# Patient Record
Sex: FEMALE | Race: WHITE | NOT HISPANIC OR LATINO | Employment: UNEMPLOYED | URBAN - METROPOLITAN AREA
[De-identification: names, ages, dates, MRNs, and addresses within clinical notes are randomized per-mention and may not be internally consistent; named-entity substitution may affect disease eponyms.]

---
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
## 2020-01-01 NOTE — HPI
This is an 18-year-old who had presented with a ruptured AVM that was resected and removed, but had to do a decompressive   craniectomy.  The patient had a followup angiogram, which showed no residual.  The patient was ready for cranioplasty, but had a significant subdural hygroma that needed to be evacuated and drained first.   Statement Selected

## 2020-02-10 ENCOUNTER — PATIENT MESSAGE (OUTPATIENT)
Dept: NEUROLOGY | Facility: CLINIC | Age: 20
End: 2020-02-10

## 2020-06-09 ENCOUNTER — TELEPHONE (OUTPATIENT)
Dept: NEUROLOGY | Facility: CLINIC | Age: 20
End: 2020-06-09

## 2020-06-09 NOTE — TELEPHONE ENCOUNTER
----- Message from Naima Dan RN sent at 6/9/2020  3:18 PM CDT -----  Contact: Elmira (mother) @ [phone]      ----- Message -----  From: Joy Armstrong  Sent: 6/9/2020   3:15 PM CDT  To: [facility] Staff    Asking to speak with someone in Dr. Miller's office regarding patient appt, asking if it would be ok with the doctor to have patient do a virtual appt. Please call.

## 2020-06-12 RX ORDER — ZONISAMIDE 100 MG/1
300 CAPSULE ORAL DAILY
Qty: 90 CAPSULE | Refills: 1 | OUTPATIENT
Start: 2020-06-12 | End: 2020-07-12

## 2020-06-12 NOTE — TELEPHONE ENCOUNTER
----- Message from Nikky Zapata sent at 2020  9:04 AM CDT -----  Contact: pts mom   Pts mom is calling to check the status of a refill on a refill request for the medication called zonisamide (ZONEGRAN) 100 MG Cap (). Pt is out of her medication pt has two days left pts refill isn't valid any more because it . [facility]  was suppose to send over a refill request. Pt needs her medication asap  Can you please call pts mom at [phone].    CARISA

## 2020-07-20 ENCOUNTER — OFFICE VISIT (OUTPATIENT)
Dept: NEUROLOGY | Facility: CLINIC | Age: 20
End: 2020-07-20
Payer: OTHER GOVERNMENT

## 2020-07-20 DIAGNOSIS — R56.9 FOCAL SEIZURE: Primary | ICD-10-CM

## 2020-07-20 PROCEDURE — 99213 OFFICE O/P EST LOW 20 MIN: CPT | Mod: 95,,, | Performed by: PSYCHIATRY & NEUROLOGY

## 2020-07-20 PROCEDURE — 99213 PR OFFICE/OUTPT VISIT, EST, LEVL III, 20-29 MIN: ICD-10-PCS | Mod: 95,,, | Performed by: PSYCHIATRY & NEUROLOGY

## 2020-07-20 RX ORDER — FOLIC ACID 1 MG/1
1 TABLET ORAL DAILY
Qty: 90 TABLET | Refills: 4 | Status: SHIPPED | OUTPATIENT
Start: 2020-07-20 | End: 2021-07-20

## 2020-07-20 RX ORDER — ZONISAMIDE 100 MG/1
300 CAPSULE ORAL DAILY
Qty: 270 CAPSULE | Refills: 4 | Status: SHIPPED | OUTPATIENT
Start: 2020-07-20 | End: 2021-08-19 | Stop reason: SDUPTHER

## 2020-07-20 NOTE — PROGRESS NOTES
Follow up:   No COVID symptoms   Compliance - good   No side effects     Prior note:   Driving now   Working as    NO sz   compliance - good   MRI today      Prior note:   No further sz since last visit   NO side effects to medications   Not driving      HPI   Accompanied by mother and boy-friend      Onset of ICH: HA->vomitting-sz-> LOC   She reports - Sensation different on the site of staples      Seizure Seminology  Seizure Type 1   Age of onset : Mid July 2018   Classification: Focal onset seizures with sec GTC  Aura - no   Ictus      Nonconv -       Conv - eyes open wide and roll back, mouth opens wide, trembling/tonic of body, clonic jerking of arms and legs      Duration -   Post-ictal      Symptoms- confusion, groggy x 20 min       Duration-  Seizure Frequency -  total #2 in same day   History of status epilepticus - no   Last seizure -      Seizure triggers: unknown      AED Treatments  ER - ativan, IV keppra      zonisamide (Zonegran, ZNA) 200 mg daily (loss of appetite)      Prior treatments   levetiracetam (Keppra, LEV) 500 mg BID - sleepy      Not tried  acetazolamide (Diamox, AZM)  carbamazepine (Tegretol, CBZ)  ethosuximide (Zarontin, ESM)  ezogabine (Potiga, EZG)  gabapentin (Neurontin, GPN)  eslicarbazepine   lacosamide (Vimpat, LCS)   lamotrigine (Lamictal, LTG)   methsuximide (Celontin, MSM)  methyphenytoin (Mesantion, MHT)  oxcarbazepine (Trileptal OXC)  pregabalin (Lyrica, PGB)   primidone (Mysoline, PRM)  perampanel (Fycompa, FCP)   phenobarbital (Pb)   phenytoin (Dilantin, PHT)  rufinamide (Banzel, RUF)  tiagabine (Gabatril,  TGB)  topiramate (Topamax, TPM)  viagabatrin, (Sabril, VGB)  valproic acid (Depakote, VPA)  Benzodiazepines:  diazepam - rectal (Diastatl)  diazepam - oral (Valium, DZ)  clonazepam (Klonopin, CZP)  clorazepate (Tranxene, CLZ)  clobezam (Onfi, CLB)  Ativan  Other:  Brain Stimulation  Vagal Nerve Stimulator  DBS     Compliance method  Memory - yes     Potential  Epilepsy Risk Factors:   Pregnancy/Labor/Delivery - full term,  uncomplicated  Pregnancy, labor and delivery  Febrile seizures - none  Head injury  - none  CNS infection - none     Stroke - none  Family Hx of Sz - none  AVM and ICH      Driving - currently driving     EEG 8/21/18 -   IMPRESSION:  This is an abnormal EEG during wakefulness, drowsiness and sleep.    The breach rhythm was noted in the left temporoparietal region.     Review of Systems   Constitutional: Negative.    HENT: Negative.    Eyes: Negative.    Respiratory: Negative.    Cardiovascular: Negative.    Gastrointestinal: Negative.    Endocrine: Negative.    Genitourinary: Negative.    Musculoskeletal: Negative.    Skin: Negative.    Allergic/Immunologic: Negative.    Neurological: Negative.    Hematological: Negative.    Psychiatric/Behavioral: Negative.       Objective:   Physical Exam   Constitutional: She is oriented to person, place, and time. She appears well-developed and well-nourished.   Psychiatric: She has a normal mood and affect. Her behavior is normal. Judgment and thought content normal.      Assessment:      1. Cerebral AVM    2. Focal seizure       Plan:   1, zonisamide 300 mg QHS  2, Take Calcium, Vitamin D supplements, folic acid daily  3, Avoid stimulants and excessive caffeine      F/up 1 yr      Patient education:   During this visit we discussed several issues related to Epilepsy including risks with continuous seizures, SUDEP, risks associated with status epilepticus. I provided hand out on first aid measures during seizures. We also discussed side effects of anti-seizure medications, potential teratogenicity, and suicide risk.      We also discussed epilepsy and the issues related to pregnancy, birth control, and breast feeding. We addressed importance of folic acid supplement.      We discussed occupational risks and hazards, driving restrictions and limitations, and general safety in patients with epilepsy. I specifically  pointed out how the patient can put herself and others at serious risks (leading to death and/or injury) if she has a seizure while driving. Patient verbalized understanding. I requested that the patient meet with DMV to discuss protocol for driving privileges in patients with seizure disorders.     The patient location is: Home   The chief complaint leading to consultation is:seizures     Visit type: audiovisual    Face to Face time with patient: 15 min   18  minutes of total time spent on the encounter, which includes face to face time and non-face to face time preparing to see the patient (eg, review of tests), Obtaining and/or reviewing separately obtained history, Documenting clinical information in the electronic or other health record, Independently interpreting results (not separately reported) and communicating results to the patient/family/caregiver, or Care coordination (not separately reported).         Each patient to whom he or she provides medical services by telemedicine is:  (1) informed of the relationship between the physician and patient and the respective role of any other health care provider with respect to management of the patient; and (2) notified that he or she may decline to receive medical services by telemedicine and may withdraw from such care at any time.

## 2020-11-17 ENCOUNTER — TELEPHONE (OUTPATIENT)
Dept: NEUROLOGY | Facility: CLINIC | Age: 20
End: 2020-11-17

## 2020-11-17 NOTE — TELEPHONE ENCOUNTER
----- Message from Nikky Zapata sent at 11/17/2020 10:51 AM CST -----  Regarding: pts mom  Pts mom is calling to speak with the nurse to see if its okay for the pt to take the following medications called zyrtec sudafed ibuprofen and a medrol steroid pack. They want to make sure its okay for the pt to take these medication with her history and her medical condition can you please call pts mom at [phone].    CARISA

## 2021-02-16 NOTE — PROGRESS NOTES
[facility]  Neurocritical Care  Progress Note    Admit Date: 6/28/2018  Service Date: 06/30/2018  Length of Stay: 2    Subjective:     Chief Complaint: <principal problem not specified>    History of Present Illness: Lisa Rivera is a 18 y.o. 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     Hospital Course: 6/28: Post cranioplasty  6/29: Continue ICU care; nausea and pain overnight resolved with meds  6/30: Refusing care, only allowing pain meds, possible [place] [facility]      Patient with pain control issues overnight and nausea. Neurological status unchanged.     Review of Systems   Constitutional: Negative for fever.   Gastrointestinal: Positive for nausea.   Neurological: Negative for weakness.       Objective:     Vitals:  Temp: 98.2 °F (36.8 °C)  Pulse: 69  Rhythm: normal sinus rhythm  BP: (!) 112/57  MAP (mmHg): 78  Resp: 19  SpO2: 99 %  O2 Device (Oxygen Therapy): room air    Temp  Min: 97.9 °F (36.6 °C)  Max: 99 °F (37.2 °C)  Pulse  Min: 56  Max: 101  BP  Min: 110/58  Max: 138/61  MAP (mmHg)  Min: 78  Max: 100  Resp  Min: 12  Max: 28  SpO2  Min: 97 %  Max: 100 %    06/29 0701 - 06/30 0700  In: 483.3 [I.V.:483.3]  Out: 1400 [Urine:1230; Drains:170]   Unmeasured Output  Stool Occurrence: 0     Physical Exam   GA: Alert, comfortable, no acute distress.   HEENT: No scleral icterus or JVD.   Pulmonary: Clear to auscultation A/L. No wheezing, crackles, or rhonchi.  Cardiac: RRR S1 & S2 w/o rubs/murmurs/gallops.   Abdominal: Bowel sounds present x 4. No appreciable hepatosplenomegaly.  Skin: No jaundice, rashes, or visible lesions.  Neuro:  --GCS: E4 V5 M6  --Mental Status:  AAO x 3, following commands,   --CN II-XII grossly intact.   --Pupils 3mm, PERRL.   --Corneal reflex, gag, cough intact.  --LUE strength: 4/5  --LLE strength: 4/5  --RUE strength: 4/5  --RLE strength: 4/5    Medications:  Continuous  Scheduled    bacitracin  Daily   ceFAZolin (ANCEF) IVPB 1 g Q8H   magnesium sulfate 2 g Once   mupirocin 1 g BID   senna-docusate 8.6-50 mg 1 tablet Daily   PRN    acetaminophen 650 mg Q6H PRN   acetaminophen 650 mg Q6H PRN   acetaminophen 650 mg Q4H PRN   HYDROmorphone 0.5 mg Q1H PRN   ondansetron 8 mg Q6H PRN   ondansetron 4 mg Q8H PRN   oxyCODONE-acetaminophen 1 tablet Q4H PRN     Today I personally reviewed pertinent medications, imaging, cardiology, lab results, microbiology results, notably:    Diet  Diet Adult Regular (IDDSI Level 7)  Diet Adult Regular (IDDSI Level 7)            Assessment/Plan:     Oncology   Anemia    Normocytic anemia          Other   Status post craniectomy    S/p crani  NSGY following  SBP < 140  q 1 hour neuro checks  Drain present, ancef ppx  PT/OT  Consider transfer to [facility]              Activity Orders          None        Prior    Alvarado Reich NP  Neurocritical Care  [facility]     ADMIT

## 2021-03-29 ENCOUNTER — PATIENT MESSAGE (OUTPATIENT)
Dept: NEUROLOGY | Facility: CLINIC | Age: 21
End: 2021-03-29

## 2021-06-10 ENCOUNTER — PATIENT MESSAGE (OUTPATIENT)
Dept: NEUROLOGY | Facility: CLINIC | Age: 21
End: 2021-06-10

## 2021-07-08 ENCOUNTER — TELEPHONE (OUTPATIENT)
Dept: NEUROLOGY | Facility: CLINIC | Age: 21
End: 2021-07-08

## 2021-08-18 ENCOUNTER — PATIENT MESSAGE (OUTPATIENT)
Dept: NEUROLOGY | Facility: CLINIC | Age: 21
End: 2021-08-18

## 2021-08-18 DIAGNOSIS — R56.9 FOCAL SEIZURE: ICD-10-CM

## 2021-08-19 RX ORDER — ZONISAMIDE 100 MG/1
300 CAPSULE ORAL DAILY
Qty: 270 CAPSULE | Refills: 3 | Status: SHIPPED | OUTPATIENT
Start: 2021-08-19 | End: 2021-10-01 | Stop reason: SDUPTHER

## 2021-10-01 ENCOUNTER — OFFICE VISIT (OUTPATIENT)
Dept: NEUROLOGY | Facility: CLINIC | Age: 21
End: 2021-10-01
Payer: OTHER GOVERNMENT

## 2021-10-01 DIAGNOSIS — R56.9 FOCAL SEIZURE: Primary | ICD-10-CM

## 2021-10-01 PROCEDURE — 99213 OFFICE O/P EST LOW 20 MIN: CPT | Mod: 95,,, | Performed by: PSYCHIATRY & NEUROLOGY

## 2021-10-01 PROCEDURE — 99213 PR OFFICE/OUTPT VISIT, EST, LEVL III, 20-29 MIN: ICD-10-PCS | Mod: 95,,, | Performed by: PSYCHIATRY & NEUROLOGY

## 2021-10-01 RX ORDER — FOLIC ACID 1 MG/1
1 TABLET ORAL DAILY
Qty: 90 TABLET | Refills: 4 | Status: SHIPPED | OUTPATIENT
Start: 2021-10-01 | End: 2022-10-01

## 2021-10-01 RX ORDER — ZONISAMIDE 100 MG/1
300 CAPSULE ORAL DAILY
Qty: 270 CAPSULE | Refills: 3 | Status: SHIPPED | OUTPATIENT
Start: 2021-10-01 | End: 2021-12-30

## 2022-07-15 NOTE — PLAN OF CARE
Problem: Physical Therapy Goal  Goal: Physical Therapy Goal  Goals to be met by: 4/20/2018    Patient will increase functional independence with mobility by performing:    Supine to sit with Supervision.  Sit to supine with Supervision.   Sit to stand transfer with Contact Guard Assistance using No Assistive Device.  Bed to chair transfer via Stand Pivot with Contact Guard Assistance using No Assistive Device.  Gait  x 55 feet with Contact Guard Assistance using No Assistive Device.    Dynamic sitting at edge of bed x 10 minutes with Contact Guard Assistance.  Dynamic standing for 10 minutes with Contact Guard Assistance using No Assistive Device.  Able to tolerate exercise for 15-20 reps with independence.  Patient and family able to teachback stroke & positioning education independently.  Ascend/descend 5 stairs with right Handrails Minimal Assistance using No Assistive Device.    Outcome: Ongoing (interventions implemented as appropriate)    Pt would benefit from Rehab upon discharge. PT eval complted.  Appropriate to transfer with: therapy  Neda Dejesus PT, DPT  4/11/2018  Pager: [phone]           Anesthesia Type: 1% lidocaine with epinephrine

## 2025-05-28 NOTE — CONSULTS
"    Caller: Yulia Iniguez \"Luciana\"    Relationship: Self    Best call back number: [phone]     Requested Prescriptions:   Requested Prescriptions     Pending Prescriptions Disp Refills    lisinopril (PRINIVIL,ZESTRIL) 40 MG tablet 90 tablet 3     Sig: Take 1 tablet by mouth Daily.    hydroCHLOROthiazide 25 MG tablet 90 tablet 3     Sig: Take 1 tablet by mouth Daily.    atorvastatin (LIPITOR) 10 MG tablet 90 tablet 3     Sig: Take 1 tablet by mouth Daily.    alendronate (FOSAMAX) 70 MG tablet 12 tablet 3     Sig: TAKE 1 TABLET EVERY 7 DAYS WITH LARGE GLASS OF WATER 30 MIN BEFORE FIRST FOOD, DRINK, MEDS; AVOID LYING DOWN FOR 30 MIN        Pharmacy where request should be sent: [facility], [address] [phone] [place] [phone] FX     Last office visit with prescribing clinician: 2/21/2025   Last telemedicine visit with prescribing clinician: Visit date not found   Next office visit with prescribing clinician: 8/22/2025         Carmina Croft Rep   05/28/25 10:23 EDT       " [facility]  Vascular Neurology  Comprehensive Stroke Center  Consult Note    Inpatient consult to Vascular (Stroke) Neurology  Consult performed by: FLORINA LOPEZ  Consult ordered by: HARLAN VILLASEÑOR        Assessment/Plan:     Patient is a 18 y.o. year old female with:    * Nontraumatic cortical hemorrhage of left cerebral hemisphere    19 yo with no significant PMHx presenting as transfer from [facility] for ICH and emergent neurosurgical evaluation. Has presented with HA, N/V with progression to LOC. Intubated upon arrival to [place]. CTH revealed large acute intraparenchymal hematoma in Left temporal lobe. Transferred [place] [facility] [place] further NSGY intervention. Upon arrival, CTA H/N revealed L temporal IPH with interventricular extension, significant mass effect and diffuse cerebral effacements, as well as arteriovenous malformation. Taken emergently to OR for hemicraniectomy, resection of AVM, and ICH evacuation with trauma flap. Admitted to [facility] post-procedure for close monitoring.    Antithrombotics for secondary stroke prevention: Antiplatelets: None: Intracerebral Hemorrhage  Statins for secondary stroke prevention and hyperlipidemia, if present:   Statins: None: Reason: LDL 72, Non-atherosclerotic process  Aggressive risk factor modification: None  Rehab efforts: PT/OT/SLP to evaluate and treat, PM&R consult   Diagnostics ordered/pending: MRI head without contrast to assess brain parenchyma, TTE to assess cardiac function/status   VTE prophylaxis: None: Reason for No Pharmacological VTE Prophylaxis: History of systemic or intracranial bleeding, Known or suspected cerebral aneurysm or AVM  BP parameters: ICH: SBP < 120 per NSGY        IVH (intraventricular hemorrhage)    Extension of hemorrhage with ventricular trapping  Pt s/p hemicrani, ICH evacuation  Will monitor exam        Cerebral AVM    Visualized on CTA H/N  Likely etiology of hemorrhage  Resected by NSGY 4/3/18         Brain herniation    2/2 stroke  Evident on imaging  Now s/p intervention with NSGY        Vasogenic brain edema    2/2 stroke  Evident on imaging            STROKE DOCUMENTATION          NIH Scale:  1a. Level Of Consciousness: 3-->Responds only with reflex motor or autonomic effects or totally unresponsive, flaccid, and areflexic  1b. LOC Questions: 2-->Answers neither question correctly  1c. LOC Commands: 2-->Performs neither task correctly  2. Best Gaze: 0-->Normal  3. Visual: 3-->Bilateral hemianopia (blind including cortical blindness)  4. Facial Palsy: 0-->Normal symmetrical movements  5a. Motor Arm, Left: 4-->No movement  5b. Motor Arm, Right: 4-->No movement  6a. Motor Leg, Left: 4-->No movement  6b. Motor Leg, Right: 4-->No movement  7. Limb Ataxia: 0-->Absent  8. Sensory: 2-->Severe to total sensory loss: patient is not aware of being touched in the face, arm, and leg  9. Best Language: 3-->Mute, global aphasia: no usable speech or auditory comprehension  10. Dysarthria: 2-->Severe dysarthria: patients speech is so slurred as to be unintelligible in the absence of or out of proportion to any dysphasia, or is mute/anarthric  11. Extinction and Inattention (formerly Neglect): 2-->Profound clement-inattention/extinction more than 1 modality  Total (NIH Stroke Scale): 35    Modified [place] Score: 0  Gabi Coma Scale:    ABCD2 Score:    [id number] Score:   HAS -BLED Score:   ICH Score:4  Hunt & Fierro Classification:       Thrombolysis Candidate? No, History of intracranial hemorrhage due to AVM or amyloid angiopathy       Interventional Revascularization Candidate?   Is the patient eligible for mechanical endovascular reperfusion (BRITNEY)?  No; No large vessel occlusion      Hemorrhagic change of an Ischemic Stroke: Does this patient have an ischemic stroke with hemorrhagic changes? No     Subjective:     History of Present Illness:  Ms. Rivera is an 19 yo with no significant PMHx who presented as transfer from  [facility] for ICH and emergent neurosurgical evaluation. Earlier today, [place] beach with [place], had symptoms of headache not resolved with Tylenol, N/V around 1200 with progression to LOC. She was intubated upon arrival to [place] ED. CTH revealed large acute intraparenchymal hematoma in Left temporal lobe. She was then transferred to [facility] for further intervention.   Upon arrival, CTA H/N revealed L temporal IPH with interventricular extension and trapping, hydrocephalus with significant mass effect and diffuse sulcal and cisternal effacements, small overlying subdural hemorrhage, as well as arteriovenous malformation. She was taken emergently to OR for hemicraniectomy, resection of AVM, and ICH evacuation with trauma flap.    Pt intubated and sedated on exam, no family at bedside. History obtained by chart review.        History reviewed. No pertinent past medical history.  History reviewed. No pertinent surgical history.  No family history on file.  Social History   Substance Use Topics    Smoking status: Never Smoker    Smokeless tobacco: Never Used    Alcohol use No     Review of patient's allergies indicates:  No Known Allergies    Medications: I have reviewed the current medication administration record.    No prescriptions prior to admission.       Review of Systems   Constitutional: Negative for activity change and fever.   HENT: Negative for mouth sores and nosebleeds.    Eyes: Negative for discharge and visual disturbance.   Respiratory: Negative for cough and stridor.    Cardiovascular: Negative for chest pain and leg swelling.   Gastrointestinal: Positive for nausea and vomiting.   Genitourinary: Negative for frequency and hematuria.   Musculoskeletal: Negative for gait problem and neck stiffness.   Skin: Negative for pallor and rash.   Neurological: Positive for syncope and headaches. Negative for speech difficulty and weakness.   Psychiatric/Behavioral: Negative for agitation and  confusion.     Objective:     Vital Signs (Most Recent):  Temp: 96.7 °F (35.9 °C) (04/03/18 1905)  Pulse: (!) 140 (04/03/18 1917)  Resp: 14 (04/03/18 1917)  BP: 123/60 (04/03/18 1511)  SpO2: 100 % (04/03/18 1917)    Vital Signs Range (Last 24H):  Temp:  [96.7 °F (35.9 °C)]   Pulse:  []   Resp:  [14-18]   BP: (113-137)/(56-63)   SpO2:  [100 %]   Arterial Line BP: (103)/(47)     Physical Exam   Constitutional: She appears well-developed and well-nourished. No distress. She is intubated.   HENT:   S/p hemicrani and EVD   Eyes:   Left pupil non-reactive, Right pupil sluggish   Cardiovascular: Tachycardia present.    Pulmonary/Chest: She is intubated. No respiratory distress.   Musculoskeletal: She exhibits no edema or deformity.   Neurological: She is unresponsive. A cranial nerve deficit and sensory deficit is present.   Skin: Skin is warm and dry.       Neurological Exam:   EXAM LIMITED AS PT ON PROPOFOL AND REQUEST PER NSGY TO LIMIT Pt EXAMS TO PUPILS ONLY ACUTELY POST-PROCEDURE  LOC: comatose  Language: Intubated  Visual Fields: No blink to threat bilaterally  EOM (CN III, IV, VI): Oculocephalic Reflex  Normal  Pupils (CN II, III): Anisocoria Side: R pupil 3 mm Reactive: Yes Sluggish  L pupil 4 mm Reactive: No   Sensation: Tactile extinction to bilateral simultaneous stimulation   Tone: Normal tone throughout      Laboratory:  CMP:   Recent Labs  Lab 04/03/18 1913   CALCIUM 9.3  9.3   ALBUMIN 3.7   PROT 5.9*     141  141   K 3.9  3.9   CO2 24  24     109   BUN 8  8   CREATININE 0.7  0.7   ALKPHOS 40*   ALT 15   AST 26   BILITOT 1.5*     CBC:   Recent Labs  Lab 04/03/18 1913   WBC 16.05*  16.05*   RBC 2.65*  2.65*   HGB 8.1*  8.1*   HCT 23.9*  23.9*     173   MCV 90  90   MCH 30.6  30.6   MCHC 33.9  33.9     Lipid Panel:   Recent Labs  Lab 04/03/18  1557   CHOL 131   LDLCALC 72.8   HDL 51   TRIG 36     Coagulation:   Recent Labs  Lab 04/03/18  1913   INR 1.2     Hgb A1C:    Recent Labs  Lab 04/03/18  1557   HGBA1C 4.8     TSH:   Recent Labs  Lab 04/03/18  1557   TSH 0.747       Diagnostic Results:      Brain/Vessel Imaging:    CTA H/N 4/3/18  Large acute intraparenchymal hematoma in the left temporal lobe with underlying arteriovenous malformation as further detailed... There is intraventricular extravasation with ventricular trapping and hydrocephalus as well as a small overlying subdural hemorrhage. Significant intracranial mass effect with diffuse sulcal and cisternal effacement.   AVM findings: The postcontrast images demonstrate a small caudal of contrast centrally within the hematoma (sequence 6 image 13) which suggests active contrast extravasation (spot sign). 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 This measures on the order of 2.5 cm in maximal transverse dimension. AVM appears to be primarily supplied by the left MCA. The there is drainage likely via a prominent vein of Blas. Question and focal stenosis of the vein of Blas at its junction with the transverse sinus which is not well opacified. Small venous varix.      Olga Vazquez PA-C  Comprehensive Stroke Center  Department of Vascular Neurology   [facility]

## (undated) DEVICE — HOOK LONE STAR BLUNT 12MM

## (undated) DEVICE — SUT VICRYL PLUS 3-0 SH 18IN

## (undated) DEVICE — STOCKINET 4INX48

## (undated) DEVICE — TUBE FRAZIER 5MM 2FT SOFT TIP

## (undated) DEVICE — DRAPE THYROID WITH ARMBOARD

## (undated) DEVICE — MARKER SKIN STND TIP BLUE BARR

## (undated) DEVICE — HOOK STAY ELAS 5MM 8EA/PK

## (undated) DEVICE — Device

## (undated) DEVICE — DIFFUSER

## (undated) DEVICE — HEMOSTAT SURGICEL 4X8IN

## (undated) DEVICE — SEE MEDLINE ITEM 157103

## (undated) DEVICE — TRAY FOLEY 16FR INFECTION CONT

## (undated) DEVICE — BANDAGE CONFORM 3IN STRL

## (undated) DEVICE — COTTON BALLS 1/2IN

## (undated) DEVICE — SEE MEDLINE ITEM 156905

## (undated) DEVICE — RUBBERBAND STERILE 3X1/8IN

## (undated) DEVICE — STYLET AXIEM 23CM SINGLE COIL

## (undated) DEVICE — CONTAINER SPECIMEN STRL 4OZ

## (undated) DEVICE — TRACKER PATIENT NON INVASIVE

## (undated) DEVICE — DURAPREP SURG SCRUB 26ML

## (undated) DEVICE — KIT SURGIFLO EVITHROM

## (undated) DEVICE — CONTAINER SPECI STRL 32OZ 64OZ

## (undated) DEVICE — KIT SURGIFLO HEMOSTATIC MATRIX

## (undated) DEVICE — WARMER DRAPE STERILE LF

## (undated) DEVICE — DRESSING TELFA STRL 4X3 LF

## (undated) DEVICE — DRESSING SURGICAL 3/4X3/4

## (undated) DEVICE — ELECTRODE REM PLYHSV RETURN 9

## (undated) DEVICE — IMPLANTABLE DEVICE
Type: IMPLANTABLE DEVICE | Site: BRAIN | Status: NON-FUNCTIONAL
Removed: 2018-04-03

## (undated) DEVICE — BIT DRILL WIRE PASS 1.0MM

## (undated) DEVICE — KIT EVACUATOR 3-SPRING 1/8 DRN

## (undated) DEVICE — SUT D SPECIAL VICRYL 2-0

## (undated) DEVICE — DRAPE OPMI STERILE

## (undated) DEVICE — CARTRIDGE OIL

## (undated) DEVICE — DRESSING SURGICAL 1X1

## (undated) DEVICE — FORCEP SPETZLER MALIS 8IN 1MM

## (undated) DEVICE — ROUTER TAPERED 2.3MM

## (undated) DEVICE — DRESSING SURGICAL 1/2X1/2

## (undated) DEVICE — CLIPS RANEY SCALP FFS/ASSY

## (undated) DEVICE — SPONGE DERMACEA GAUZE 4X4

## (undated) DEVICE — SPONGE NEURO 1/4X1/4

## (undated) DEVICE — TAPE SURG MEDIPORE 6X72IN

## (undated) DEVICE — SPRAY MASTISOL

## (undated) DEVICE — SEE MEDLINE ITEM 154981

## (undated) DEVICE — SEE MEDLINE ITEM 157131

## (undated) DEVICE — DRESSING SURGICAL 1X3

## (undated) DEVICE — GAUZE SPONGE 4X4 12PLY

## (undated) DEVICE — SUT 4/0 18IN NUROLON BLK B

## (undated) DEVICE — DRAPE STERI INSTRUMENT 1018

## (undated) DEVICE — ELECTRODE BLADE INSULATED 1 IN

## (undated) DEVICE — CORD BIPOLAR 12 FOOT

## (undated) DEVICE — STAPLER SKIN PROXIMATE WIDE

## (undated) DEVICE — SPONGE DERMACEA 4X4IN 12PLY

## (undated) DEVICE — BLADE SURG CARBON STEEL SZ11

## (undated) DEVICE — SEE MEDLINE ITEM 147518

## (undated) DEVICE — SEE MEDLINE ITEM 152622

## (undated) DEVICE — BUR BONE CUT MICRO TPS 3X3.8MM

## (undated) DEVICE — MARKERS SPHERZ PASSIVE